# Patient Record
Sex: FEMALE | Race: BLACK OR AFRICAN AMERICAN | NOT HISPANIC OR LATINO
[De-identification: names, ages, dates, MRNs, and addresses within clinical notes are randomized per-mention and may not be internally consistent; named-entity substitution may affect disease eponyms.]

---

## 2019-05-25 ENCOUNTER — TRANSCRIPTION ENCOUNTER (OUTPATIENT)
Age: 44
End: 2019-05-25

## 2019-12-23 ENCOUNTER — NON-APPOINTMENT (OUTPATIENT)
Age: 44
End: 2019-12-23

## 2019-12-23 ENCOUNTER — APPOINTMENT (OUTPATIENT)
Dept: OPHTHALMOLOGY | Facility: CLINIC | Age: 44
End: 2019-12-23
Payer: COMMERCIAL

## 2019-12-23 PROCEDURE — 92004 COMPRE OPH EXAM NEW PT 1/>: CPT

## 2020-04-26 ENCOUNTER — MESSAGE (OUTPATIENT)
Age: 45
End: 2020-04-26

## 2020-05-12 LAB
SARS-COV-2 IGG SERPL IA-ACNC: 0.2 INDEX
SARS-COV-2 IGG SERPL QL IA: NEGATIVE

## 2020-10-14 ENCOUNTER — LABORATORY RESULT (OUTPATIENT)
Age: 45
End: 2020-10-14

## 2020-10-14 ENCOUNTER — TRANSCRIPTION ENCOUNTER (OUTPATIENT)
Age: 45
End: 2020-10-14

## 2020-10-14 ENCOUNTER — APPOINTMENT (OUTPATIENT)
Dept: INTERNAL MEDICINE | Facility: CLINIC | Age: 45
End: 2020-10-14
Payer: COMMERCIAL

## 2020-10-14 VITALS
HEIGHT: 66 IN | DIASTOLIC BLOOD PRESSURE: 71 MMHG | BODY MASS INDEX: 31.98 KG/M2 | WEIGHT: 199 LBS | OXYGEN SATURATION: 100 % | TEMPERATURE: 98.1 F | HEART RATE: 68 BPM | SYSTOLIC BLOOD PRESSURE: 115 MMHG

## 2020-10-14 DIAGNOSIS — Z80.3 FAMILY HISTORY OF MALIGNANT NEOPLASM OF BREAST: ICD-10-CM

## 2020-10-14 DIAGNOSIS — Z80.41 FAMILY HISTORY OF MALIGNANT NEOPLASM OF OVARY: ICD-10-CM

## 2020-10-14 DIAGNOSIS — Z78.9 OTHER SPECIFIED HEALTH STATUS: ICD-10-CM

## 2020-10-14 DIAGNOSIS — Z82.49 FAMILY HISTORY OF ISCHEMIC HEART DISEASE AND OTHER DISEASES OF THE CIRCULATORY SYSTEM: ICD-10-CM

## 2020-10-14 DIAGNOSIS — Z80.49 FAMILY HISTORY OF MALIGNANT NEOPLASM OF OTHER GENITAL ORGANS: ICD-10-CM

## 2020-10-14 PROCEDURE — 99386 PREV VISIT NEW AGE 40-64: CPT

## 2020-10-14 PROCEDURE — G0442 ANNUAL ALCOHOL SCREEN 15 MIN: CPT | Mod: NC

## 2020-10-14 NOTE — PHYSICAL EXAM
[Normal] : no respiratory distress, lungs were clear to auscultation bilaterally and no accessory muscle use [Soft] : abdomen soft [Non Tender] : non-tender [Non-distended] : non-distended [No HSM] : no HSM [de-identified] : small umbilical hernia w.o erythema, discoloration, tenderness

## 2020-10-14 NOTE — HEALTH RISK ASSESSMENT
[Patient reported mammogram was normal] : Patient reported mammogram was normal [Patient reported PAP Smear was normal] : Patient reported PAP Smear was normal [Yes] : Yes [Never (0 pts)] : Never (0 points) [1 or 2 (0 pts)] : 1 or 2 (0 points) [Monthly or less (1 pt)] : Monthly or less (1 point) [No] : In the past 12 months have you used drugs other than those required for medical reasons? No [0] : 2) Feeling down, depressed, or hopeless: Not at all (0) [Employed] : employed [Feels Safe at Home] : Feels safe at home [Fully functional (using the telephone, shopping, preparing meals, housekeeping, doing laundry, using] : Fully functional and needs no help or supervision to perform IADLs (using the telephone, shopping, preparing meals, housekeeping, doing laundry, using transportation, managing medications and managing finances) [Fully functional (bathing, dressing, toileting, transferring, walking, feeding)] : Fully functional (bathing, dressing, toileting, transferring, walking, feeding) [] : No [Audit-CScore] : 1 [[id number]] : 0 [Reports changes in hearing] : Reports no changes in hearing [Reports changes in vision] : Reports no changes in vision [Reports changes in dental health] : Reports no changes in dental health [PapSmearDate] : 07/19 [MammogramDate] : 07/19

## 2020-10-14 NOTE — HISTORY OF PRESENT ILLNESS
[de-identified] : \par Ms. 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 \par \par Diet: unpredicatble; skips breakfasts/\par \par Exercise regimen: more now than before; likes boot-camping or youtube 2-3x per week

## 2020-10-14 NOTE — PLAN
[FreeTextEntry1] : \par Ms. Tinajero is a 46 y/o female presents as a new patient for CPE\par No PMH\par \par 1. Umbilical hernia: moniotr for now; Pt counseled to call MD if new symptoms develop or worsen. \par \par 2. HCM: routine labs - CBC, CMP, TSH, A1C, Lipids, HIV, Hep B, Hep C screen today; up to date on vaccinations; Mammogram results to be sent by patient\par \par 3. Obesity: Urged calorie restriction and portion control. Limit carbohydrates and calorie dense foods; limit fats; also discussed importance of predictable meals - small frequent meals vs. traditional meals; 15 minutes spent on weight management counseling \par \par Pt counseled to call MD if new symptoms develop or worsen. \par all questions answered, patient amenable to plan above \par \par RTO in1 year or sooner

## 2020-10-20 ENCOUNTER — TRANSCRIPTION ENCOUNTER (OUTPATIENT)
Age: 45
End: 2020-10-20

## 2020-10-20 LAB
ALBUMIN SERPL ELPH-MCNC: 4.5 G/DL
ALP BLD-CCNC: 97 U/L
ALT SERPL-CCNC: 7 U/L
ANION GAP SERPL CALC-SCNC: 13 MMOL/L
AST SERPL-CCNC: 14 U/L
BASOPHILS # BLD AUTO: 0.1 K/UL
BASOPHILS NFR BLD AUTO: 1.5 %
BILIRUB SERPL-MCNC: 0.2 MG/DL
BUN SERPL-MCNC: 11 MG/DL
CALCIUM SERPL-MCNC: 9.9 MG/DL
CHLORIDE SERPL-SCNC: 105 MMOL/L
CHOLEST SERPL-MCNC: 168 MG/DL
CHOLEST/HDLC SERPL: 2.1 RATIO
CO2 SERPL-SCNC: 18 MMOL/L
CREAT SERPL-MCNC: 0.8 MG/DL
EOSINOPHIL # BLD AUTO: 0.1 K/UL
EOSINOPHIL NFR BLD AUTO: 1.5 %
ESTIMATED AVERAGE GLUCOSE: 105 MG/DL
GLUCOSE SERPL-MCNC: 93 MG/DL
HBA1C MFR BLD HPLC: 5.3 %
HBV SURFACE AB SER QL: REACTIVE
HBV SURFACE AG SER QL: NONREACTIVE
HCT VFR BLD CALC: 34.7 %
HCV AB SER QL: NONREACTIVE
HCV S/CO RATIO: 0.11 S/CO
HDLC SERPL-MCNC: 82 MG/DL
HGB BLD-MCNC: 9.1 G/DL
HIV1+2 AB SPEC QL IA.RAPID: NONREACTIVE
IMM GRANULOCYTES NFR BLD AUTO: 0.5 %
LDLC SERPL CALC-MCNC: 76 MG/DL
LYMPHOCYTES # BLD AUTO: 1.88 K/UL
LYMPHOCYTES NFR BLD AUTO: 29 %
MAN DIFF?: NORMAL
MCHC RBC-ENTMCNC: 18 PG
MCHC RBC-ENTMCNC: 26.2 GM/DL
MCV RBC AUTO: 68.7 FL
MONOCYTES # BLD AUTO: 0.68 K/UL
MONOCYTES NFR BLD AUTO: 10.5 %
NEUTROPHILS # BLD AUTO: 3.7 K/UL
NEUTROPHILS NFR BLD AUTO: 57 %
PLATELET # BLD AUTO: 360 K/UL
POTASSIUM SERPL-SCNC: 4.4 MMOL/L
PROT SERPL-MCNC: 7.6 G/DL
RBC # BLD: 5.05 M/UL
RBC # FLD: 23.2 %
SODIUM SERPL-SCNC: 137 MMOL/L
TRIGL SERPL-MCNC: 50 MG/DL
TSH SERPL-ACNC: 2.67 UIU/ML
WBC # FLD AUTO: 6.49 K/UL

## 2021-03-03 ENCOUNTER — TRANSCRIPTION ENCOUNTER (OUTPATIENT)
Age: 46
End: 2021-03-03

## 2021-04-09 ENCOUNTER — APPOINTMENT (OUTPATIENT)
Dept: BARIATRICS/WEIGHT MGMT | Facility: CLINIC | Age: 46
End: 2021-04-09
Payer: COMMERCIAL

## 2021-04-09 PROCEDURE — 99204 OFFICE O/P NEW MOD 45 MIN: CPT | Mod: 95

## 2021-04-26 NOTE — ASSESSMENT
[FreeTextEntry1] : 46 yo woman with obesity and ratcheting weight gain\par \par Recommend the following:\par reviewed metabolic labs\par whole foods based eating strategy limiting refined carbs and added fats - plant leaning\par try to consume 2-3 discrete meals instead of snacking/grazing\par keep food journal\par track daily activity\par more moderate intensity cardio and then back to HIIT, etc when physically fit\par will hold off on pharmacotherapy for now\par rtc in 4 weeks.\par

## 2021-04-26 NOTE — HISTORY OF PRESENT ILLNESS
[FreeTextEntry1] : 44 yo woman with obesity presents for initial eval\par \par She reports ratcheting weight gain throughout adulthood.\par Lost 20+ lbs with meds prior to joining  in 2002.  gained weight back after.  gained weight again with each of two pregnanies.  finds she gains more weight when not as physically active.\par \par she tends to consume a diet high in meat and particularly high in added fats.  she tends to avoid sugar but eats a fair amount of refined starches.  She tends to eat breakfast but then snacks throughout the day on stress food.  \par \par She enjoys boxing and HIIT but has not been active of late.  She owns an elliptical and treadmill and prefers to exercise in the AM.  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  She would like to lose substantial weight and

## 2021-05-21 ENCOUNTER — APPOINTMENT (OUTPATIENT)
Dept: BARIATRICS/WEIGHT MGMT | Facility: CLINIC | Age: 46
End: 2021-05-21
Payer: COMMERCIAL

## 2021-05-21 PROCEDURE — 99213 OFFICE O/P EST LOW 20 MIN: CPT | Mod: 95

## 2021-05-31 NOTE — ASSESSMENT
[FreeTextEntry1] : 46 yo woman with obesity and ratcheting weight gain - feels weight has stabilized and less bloated but no appreciable weight loss to date\par \par Recommend the following:\par had been getting more exercise - cont to advance\par focus on whole foods, plant leaning - minimize animal protein portions, legumes>fish>poultry\par will hold off on pharmacotherapy for now\par rtc in 4 weeks.\par

## 2021-05-31 NOTE — REASON FOR VISIT
[Initial Consultation] : an initial consultation for [Obesity] : obesity [Home] : at home, [unfilled] , at the time of the visit. [Other Location: e.g. Home (Enter Location, City,State)___] : at [unfilled] [Verbal consent obtained from patient] : the patient, [unfilled]

## 2021-05-31 NOTE — HISTORY OF PRESENT ILLNESS
[FreeTextEntry1] : 46 yo woman with obesity presents for initial eval\par \par She reports ratcheting weight gain throughout adulthood.\par Lost 20+ lbs with meds prior to joining  in 2002.  gained weight back after.  gained weight again with each of two pregnanies.  finds she gains more weight when not as physically active.\par \par she tends to consume a diet high in meat and particularly high in added fats.  she tends to avoid sugar but eats a fair amount of refined starches.  She tends to eat breakfast but then snacks throughout the day on stress food.  \par \par She enjoys boxing and HIIT but has not been active of late.  She owns an elliptical and treadmill and prefers to exercise in the AM.  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  She would like to lose substantial weight and

## 2021-08-25 ENCOUNTER — APPOINTMENT (OUTPATIENT)
Dept: ORTHOPEDIC SURGERY | Facility: CLINIC | Age: 46
End: 2021-08-25
Payer: COMMERCIAL

## 2021-08-25 VITALS
BODY MASS INDEX: 34.16 KG/M2 | WEIGHT: 205 LBS | OXYGEN SATURATION: 97 % | HEART RATE: 80 BPM | SYSTOLIC BLOOD PRESSURE: 121 MMHG | DIASTOLIC BLOOD PRESSURE: 69 MMHG | HEIGHT: 65 IN

## 2021-08-25 DIAGNOSIS — M79.18 MYALGIA, OTHER SITE: ICD-10-CM

## 2021-08-25 DIAGNOSIS — S16.1XXA STRAIN OF MUSCLE, FASCIA AND TENDON AT NECK LEVEL, INITIAL ENCOUNTER: ICD-10-CM

## 2021-08-25 PROCEDURE — 99203 OFFICE O/P NEW LOW 30 MIN: CPT

## 2021-08-25 PROCEDURE — 72050 X-RAY EXAM NECK SPINE 4/5VWS: CPT

## 2021-08-25 NOTE — PHYSICAL EXAM
[de-identified] : General: Well-nourished, well-developed, alert, and in no acute distress.\par Head: Normocephalic.\par Eyes: Pupils equal round reactive to light and accommodation, extraocular muscles intact, normal sclera.\par Nose: No nasal discharge.\par Cardiac: Regular rate. Extremities are warm and well perfused. Distal pulses are symmetric bilaterally.\par Respiratory: No labored breathing.\par Extremities: Sensation is intact distally bilaterally.  Distal pulses are symmetric bilaterally\par Lymphatic: No regional lymphadenopathy, no lymphedema\par Neurologic: No focal deficits\par Skin: Normal skin color, texture, and turgor\par Psychiatric: Normal affect\par MSK: as noted above/below\par \par \par \par Cervical Spine:\par \par Inspection:\par ·	Alignment/Posture: No scoliosis or deformity, FORWARD POSTUR\par ·	Spasm not noted. \par ·	No scars\par \par Palpation:   \par ·	Midline:  NO pain\par ·	Paracervical:    NO pain\par ·	Trapezius:   NO pain\par ·	Levator Scapulae:  NO pain\par ·	Rhomboids:  NO pain\par \par \par ROM: \par ·	 Flexion:  50 degrees, without pain.\par ·	Extension:  60 degrees without pain  \par ·	Side Bendin degrees without pain \par ·	Rotation:  80 degrees without pain \par \par \par Strength: \par ·	5/5 Flexion, Extension, Sidebending, Rotation\par \par NEURO  \par ·	Sensation:   Intact throughout the upper and lower extremity\par ·	DTR's:  Symmetric throughout the upper and lower extremity.  \par \par Other tests: \par ·	Spurling's Maneuver: NEGATIVE \par                 Ayala Test: Negative\par Vascular:  \par ·	No cyanosis, clubbing, edema.  \par ·	Intact pulses distally\par  [de-identified] : Xray Cervical Spine - Multiple views were reviewed with the patient in detail.  There is normal curvature.  There is no acute fracture.  There does not appear to be instability with flexion/extension.  Vertebral heights and disc spaces appear preserved.\par \par

## 2021-08-25 NOTE — DISCUSSION/SUMMARY
[Medication Risks Reviewed] : Medication risks reviewed [de-identified] : The patient is a 45 year old, rhd woman presenting with a 4 month history of chronic, atraumatic lower paracervical soreness.  No acute neurologic deficits today.  Her symptoms are likely secondary to cervical strain/text neck.\par \par Imaging/Diagnostics/Medical Records were interpreted and/or reviewed and results were reviewed with the patient in detail.  All questions were answered appropriately.\par \par I discussed the treatment of neck pain/radiculopathy with the patient at length today. I described the spectrum of treatment from nonoperative modalities to surgery. Noninvasive and nonoperative treatment modalities include relative rest, activity modification, PRN use of acetaminophen/ anti-inflammatory medication if tolerated, corticosteroids if indicated, home exercise program and physical therapy.  We also discussed adjunctive treatment including heat therapy, accupuncture, manual therapy, TENS unit.  Further treatments can include interventional spine procedures such as epidural injections, and surgical consultation.\par \par The patient was counseled on ergonomic modifications - lumbar/cervical support, frequent breaks, screens/phones at eye level.  The patient was also provided some general home exercises to focus on posture correction, scapular stabilization.  The patient was counseled on activity modification.\par \par Warm compresses, local lidoderm cream as needed.\par \par Follow-up in 4-6 weeks if symptoms continue.  Consider MRI imaging.\par \par ------------------------------------------------------------------------------------------------------------------\par Patient appreciates and agrees with current plan.\par \par The patient's diagnosis above was evaluated by me, personally.  Diagnostic Testing and treatment options were discussed with the patient in detail.  The risks/benefits/potential complications of diagnostic testing/treatments were described in detail.  \par \par This note was generated using a mixture of manual typing and dragon medical dictation software.  A reasonable effort has been made for proofreading its contents, but typos may still remain.  If there are any questions or points of clarification needed please notify my office.\par \par \par >30 minutes of time was spent on total encounter.  >50% of the visit was spent on face-to-face counseling/coordination of care and medical-decision making for this patient.\par \par \par

## 2021-08-25 NOTE — HISTORY OF PRESENT ILLNESS
[de-identified] : The patient is a 45 year old, rhd woman presenting with neck pain.\par \par She works at the [employer] [facility].\par \par She presents with a 4 month history of chronic, atraumatic lower paracervical soreness.  The patient denies distal numbness, weakness, paresthesias.  The patient denies red flag symptoms including fever, chills, weight loss, night sweats, bowel/bladder dysfunction, saddle anesthesia.  She is not particularly active.  She does sit for prolonged periods of time, and thinks that prolonged neck flexion puts strain on her neck area.  She denies dropping objects or gait instability.\par \par Pain is mild in intensity, described as achy, improved with rest, worse with prolonged neck flexion.

## 2021-09-02 ENCOUNTER — APPOINTMENT (OUTPATIENT)
Dept: ORTHOPEDIC SURGERY | Facility: CLINIC | Age: 46
End: 2021-09-02

## 2021-11-03 ENCOUNTER — APPOINTMENT (OUTPATIENT)
Dept: PLASTIC SURGERY | Facility: CLINIC | Age: 46
End: 2021-11-03

## 2021-12-13 ENCOUNTER — APPOINTMENT (OUTPATIENT)
Dept: HUMAN REPRODUCTION | Facility: CLINIC | Age: 46
End: 2021-12-13

## 2022-05-04 ENCOUNTER — OUTPATIENT (OUTPATIENT)
Dept: OUTPATIENT SERVICES | Facility: HOSPITAL | Age: 47
LOS: 1 days | End: 2022-05-04

## 2022-05-04 ENCOUNTER — APPOINTMENT (OUTPATIENT)
Dept: INFECTIOUS DISEASE | Facility: CLINIC | Age: 47
End: 2022-05-04
Payer: COMMERCIAL

## 2022-05-04 VITALS
SYSTOLIC BLOOD PRESSURE: 104 MMHG | BODY MASS INDEX: 35.82 KG/M2 | RESPIRATION RATE: 12 BRPM | TEMPERATURE: 97.5 F | DIASTOLIC BLOOD PRESSURE: 71 MMHG | OXYGEN SATURATION: 99 % | HEIGHT: 65 IN | WEIGHT: 215 LBS | HEART RATE: 68 BPM

## 2022-05-04 DIAGNOSIS — R51.9 HEADACHE, UNSPECIFIED: ICD-10-CM

## 2022-05-04 DIAGNOSIS — Z12.31 ENCOUNTER FOR SCREENING MAMMOGRAM FOR MALIGNANT NEOPLASM OF BREAST: ICD-10-CM

## 2022-05-04 DIAGNOSIS — Z12.4 ENCOUNTER FOR SCREENING FOR MALIGNANT NEOPLASM OF CERVIX: ICD-10-CM

## 2022-05-04 DIAGNOSIS — R53.83 OTHER FATIGUE: ICD-10-CM

## 2022-05-04 DIAGNOSIS — Z00.00 ENCOUNTER FOR GENERAL ADULT MEDICAL EXAMINATION W/OUT ABNORMAL FINDINGS: ICD-10-CM

## 2022-05-04 DIAGNOSIS — E66.9 OBESITY, UNSPECIFIED: ICD-10-CM

## 2022-05-04 DIAGNOSIS — Z11.3 ENCOUNTER FOR SCREENING FOR INFECTIONS WITH A PREDOMINANTLY SEXUAL MODE OF TRANSMISSION: ICD-10-CM

## 2022-05-04 DIAGNOSIS — D25.9 LEIOMYOMA OF UTERUS, UNSPECIFIED: ICD-10-CM

## 2022-05-04 LAB
A1C WITH ESTIMATED AVERAGE GLUCOSE RESULT: 5 % — SIGNIFICANT CHANGE UP (ref 4–5.6)
ALBUMIN SERPL ELPH-MCNC: 4.3 G/DL — SIGNIFICANT CHANGE UP (ref 3.3–5)
ALP SERPL-CCNC: 103 U/L — SIGNIFICANT CHANGE UP (ref 40–120)
ALT FLD-CCNC: 10 U/L — SIGNIFICANT CHANGE UP (ref 10–45)
ANION GAP SERPL CALC-SCNC: 13 MMOL/L — SIGNIFICANT CHANGE UP (ref 5–17)
APPEARANCE UR: CLEAR — SIGNIFICANT CHANGE UP
AST SERPL-CCNC: 18 U/L — SIGNIFICANT CHANGE UP (ref 10–40)
BACTERIA # UR AUTO: PRESENT /HPF
BILIRUB SERPL-MCNC: 0.2 MG/DL — SIGNIFICANT CHANGE UP (ref 0.2–1.2)
BILIRUB UR-MCNC: NEGATIVE — SIGNIFICANT CHANGE UP
BUN SERPL-MCNC: 11 MG/DL — SIGNIFICANT CHANGE UP (ref 7–23)
CALCIUM SERPL-MCNC: 9.6 MG/DL — SIGNIFICANT CHANGE UP (ref 8.4–10.5)
CHLORIDE SERPL-SCNC: 105 MMOL/L — SIGNIFICANT CHANGE UP (ref 96–108)
CHOLEST SERPL-MCNC: 174 MG/DL — SIGNIFICANT CHANGE UP
CO2 SERPL-SCNC: 21 MMOL/L — LOW (ref 22–31)
COLOR SPEC: YELLOW — SIGNIFICANT CHANGE UP
COMMENT - URINE: SIGNIFICANT CHANGE UP
CREAT SERPL-MCNC: 0.68 MG/DL — SIGNIFICANT CHANGE UP (ref 0.5–1.3)
DIFF PNL FLD: ABNORMAL
EGFR: 110 ML/MIN/1.73M2 — SIGNIFICANT CHANGE UP
EPI CELLS # UR: ABNORMAL /HPF (ref 0–5)
ESTIMATED AVERAGE GLUCOSE: 97 MG/DL — SIGNIFICANT CHANGE UP (ref 68–114)
FERRITIN SERPL-MCNC: 6 NG/ML — LOW (ref 15–150)
GLUCOSE SERPL-MCNC: 88 MG/DL — SIGNIFICANT CHANGE UP (ref 70–99)
GLUCOSE UR QL: NEGATIVE — SIGNIFICANT CHANGE UP
HCT VFR BLD CALC: 30.8 % — LOW (ref 34.5–45)
HDLC SERPL-MCNC: 79 MG/DL — SIGNIFICANT CHANGE UP
HGB BLD-MCNC: 8.9 G/DL — LOW (ref 11.5–15.5)
HIV 1+2 AB+HIV1 P24 AG SERPL QL IA: SIGNIFICANT CHANGE UP
IRON SATN MFR SERPL: 15 UG/DL — LOW (ref 30–160)
IRON SATN MFR SERPL: 4 % — LOW (ref 14–50)
KETONES UR-MCNC: NEGATIVE — SIGNIFICANT CHANGE UP
LEUKOCYTE ESTERASE UR-ACNC: ABNORMAL
LIPID PNL WITH DIRECT LDL SERPL: 78 MG/DL — SIGNIFICANT CHANGE UP
MCHC RBC-ENTMCNC: 18.7 PG — LOW (ref 27–34)
MCHC RBC-ENTMCNC: 28.9 GM/DL — LOW (ref 32–36)
MCV RBC AUTO: 64.7 FL — LOW (ref 80–100)
NITRITE UR-MCNC: NEGATIVE — SIGNIFICANT CHANGE UP
NON HDL CHOLESTEROL: 95 MG/DL — SIGNIFICANT CHANGE UP
NRBC # BLD: 0 /100 WBCS — SIGNIFICANT CHANGE UP (ref 0–0)
PH UR: 6.5 — SIGNIFICANT CHANGE UP (ref 5–8)
PLATELET # BLD AUTO: 373 K/UL — SIGNIFICANT CHANGE UP (ref 150–400)
POTASSIUM SERPL-MCNC: 4.1 MMOL/L — SIGNIFICANT CHANGE UP (ref 3.5–5.3)
POTASSIUM SERPL-SCNC: 4.1 MMOL/L — SIGNIFICANT CHANGE UP (ref 3.5–5.3)
PROT SERPL-MCNC: 7.7 G/DL — SIGNIFICANT CHANGE UP (ref 6–8.3)
PROT UR-MCNC: NEGATIVE MG/DL — SIGNIFICANT CHANGE UP
RBC # BLD: 4.76 M/UL — SIGNIFICANT CHANGE UP (ref 3.8–5.2)
RBC # FLD: 21.8 % — HIGH (ref 10.3–14.5)
RBC CASTS # UR COMP ASSIST: > 10 /HPF
SODIUM SERPL-SCNC: 139 MMOL/L — SIGNIFICANT CHANGE UP (ref 135–145)
SP GR SPEC: 1.02 — SIGNIFICANT CHANGE UP (ref 1–1.03)
TIBC SERPL-MCNC: 396 UG/DL — SIGNIFICANT CHANGE UP (ref 220–430)
TRANSFERRIN SERPL-MCNC: 315 MG/DL — SIGNIFICANT CHANGE UP (ref 200–360)
TRIGL SERPL-MCNC: 87 MG/DL — SIGNIFICANT CHANGE UP
TSH SERPL-MCNC: 1.86 UIU/ML — SIGNIFICANT CHANGE UP (ref 0.27–4.2)
UIBC SERPL-MCNC: 381 UG/DL — HIGH (ref 110–370)
UROBILINOGEN FLD QL: 0.2 E.U./DL — SIGNIFICANT CHANGE UP
WBC # BLD: 9 K/UL — SIGNIFICANT CHANGE UP (ref 3.8–10.5)
WBC # FLD AUTO: 9 K/UL — SIGNIFICANT CHANGE UP (ref 3.8–10.5)
WBC UR QL: < 5 /HPF — SIGNIFICANT CHANGE UP

## 2022-05-04 PROCEDURE — 99213 OFFICE O/P EST LOW 20 MIN: CPT | Mod: 25

## 2022-05-04 PROCEDURE — 99386 PREV VISIT NEW AGE 40-64: CPT

## 2022-05-05 DIAGNOSIS — Z00.00 ENCOUNTER FOR GENERAL ADULT MEDICAL EXAMINATION WITHOUT ABNORMAL FINDINGS: ICD-10-CM

## 2022-05-05 DIAGNOSIS — Z12.4 ENCOUNTER FOR SCREENING FOR MALIGNANT NEOPLASM OF CERVIX: ICD-10-CM

## 2022-05-05 DIAGNOSIS — D25.9 LEIOMYOMA OF UTERUS, UNSPECIFIED: ICD-10-CM

## 2022-05-05 DIAGNOSIS — R51.9 HEADACHE, UNSPECIFIED: ICD-10-CM

## 2022-05-05 DIAGNOSIS — D50.9 IRON DEFICIENCY ANEMIA, UNSPECIFIED: ICD-10-CM

## 2022-05-05 DIAGNOSIS — Z11.3 ENCOUNTER FOR SCREENING FOR INFECTIONS WITH A PREDOMINANTLY SEXUAL MODE OF TRANSMISSION: ICD-10-CM

## 2022-05-05 DIAGNOSIS — R53.83 OTHER FATIGUE: ICD-10-CM

## 2022-05-05 DIAGNOSIS — Z12.31 ENCOUNTER FOR SCREENING MAMMOGRAM FOR MALIGNANT NEOPLASM OF BREAST: ICD-10-CM

## 2022-05-05 DIAGNOSIS — E66.9 OBESITY, UNSPECIFIED: ICD-10-CM

## 2022-05-05 LAB
24R-OH-CALCIDIOL SERPL-MCNC: 24.2 NG/ML — LOW (ref 30–80)
C TRACH RRNA SPEC QL NAA+PROBE: SIGNIFICANT CHANGE UP
N GONORRHOEA RRNA SPEC QL NAA+PROBE: SIGNIFICANT CHANGE UP
SPECIMEN SOURCE: SIGNIFICANT CHANGE UP
T PALLIDUM AB TITR SER: NEGATIVE — SIGNIFICANT CHANGE UP

## 2022-05-05 NOTE — HISTORY OF PRESENT ILLNESS
[FreeTextEntry1] : Headaches, fatigue eval\par Establish care [de-identified] : 46F w/ Fe def anemia presents to establish care.\par \par Reports worsening fatigue. Is concerned about her iron levels.\par No weight changes or heat/cold intolerance.  \par \par Also noted to have weekly headaches, not debilitating, relieved by tylenol.  Frontal, occasional in AM but mostly during the day.  Minimal caffeine intake.  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  \par Needs pap and mammo.

## 2022-05-05 NOTE — PLAN
[FreeTextEntry1] : 46F presents to establish care\par \par Fatigue: likely 2/2 anemia -- will check thyroid and CBC/CMP.\par Fe def anemia: CBC and Fe studies today.  On oral supp.  May need heme for Fe infusions.\par Headaches: Likely tensions headache given location and timing.  PRN tylenol, symptom log.  Could consider topiramate if worsens.\par Mammo ordered\par Gyn referral for pap and eval of uterine fibroids.\par BMI >30 : lipids and A1c today\par STD screening.\par \par RTC 3 months for montioring of fatigue symptoms and headaches.

## 2022-05-10 ENCOUNTER — TRANSCRIPTION ENCOUNTER (OUTPATIENT)
Age: 47
End: 2022-05-10

## 2022-05-10 RX ORDER — UBIDECARENONE/VIT E ACET 100MG-5
50 MCG CAPSULE ORAL
Qty: 30 | Refills: 2 | Status: ACTIVE | COMMUNITY
Start: 2022-05-10 | End: 1900-01-01

## 2022-05-12 ENCOUNTER — APPOINTMENT (OUTPATIENT)
Dept: MAMMOGRAPHY | Facility: HOSPITAL | Age: 47
End: 2022-05-12

## 2022-05-13 ENCOUNTER — APPOINTMENT (OUTPATIENT)
Dept: OBGYN | Facility: CLINIC | Age: 47
End: 2022-05-13

## 2022-05-16 ENCOUNTER — RESULT REVIEW (OUTPATIENT)
Age: 47
End: 2022-05-16

## 2022-06-10 ENCOUNTER — OUTPATIENT (OUTPATIENT)
Dept: OUTPATIENT SERVICES | Facility: HOSPITAL | Age: 47
LOS: 1 days | End: 2022-06-10
Payer: COMMERCIAL

## 2022-06-10 ENCOUNTER — APPOINTMENT (OUTPATIENT)
Dept: ULTRASOUND IMAGING | Facility: HOSPITAL | Age: 47
End: 2022-06-10

## 2022-06-10 PROCEDURE — 76856 US EXAM PELVIC COMPLETE: CPT

## 2022-06-10 PROCEDURE — 76830 TRANSVAGINAL US NON-OB: CPT

## 2022-06-10 PROCEDURE — 76856 US EXAM PELVIC COMPLETE: CPT | Mod: 26

## 2022-06-10 PROCEDURE — 76830 TRANSVAGINAL US NON-OB: CPT | Mod: 26

## 2022-06-26 ENCOUNTER — NON-APPOINTMENT (OUTPATIENT)
Age: 47
End: 2022-06-26

## 2022-08-03 ENCOUNTER — APPOINTMENT (OUTPATIENT)
Dept: INFECTIOUS DISEASE | Facility: CLINIC | Age: 47
End: 2022-08-03

## 2022-09-14 ENCOUNTER — LABORATORY RESULT (OUTPATIENT)
Age: 47
End: 2022-09-14

## 2022-09-14 ENCOUNTER — APPOINTMENT (OUTPATIENT)
Dept: HEMATOLOGY ONCOLOGY | Facility: CLINIC | Age: 47
End: 2022-09-14

## 2022-09-14 VITALS
TEMPERATURE: 97.3 F | SYSTOLIC BLOOD PRESSURE: 125 MMHG | HEIGHT: 65 IN | BODY MASS INDEX: 36.65 KG/M2 | OXYGEN SATURATION: 100 % | WEIGHT: 220 LBS | HEART RATE: 84 BPM | DIASTOLIC BLOOD PRESSURE: 76 MMHG

## 2022-09-14 DIAGNOSIS — D50.9 IRON DEFICIENCY ANEMIA, UNSPECIFIED: ICD-10-CM

## 2022-09-14 DIAGNOSIS — R79.89 OTHER SPECIFIED ABNORMAL FINDINGS OF BLOOD CHEMISTRY: ICD-10-CM

## 2022-09-14 PROCEDURE — 36415 COLL VENOUS BLD VENIPUNCTURE: CPT

## 2022-09-14 PROCEDURE — 99203 OFFICE O/P NEW LOW 30 MIN: CPT | Mod: 25

## 2022-09-15 ENCOUNTER — TRANSCRIPTION ENCOUNTER (OUTPATIENT)
Age: 47
End: 2022-09-15

## 2022-09-15 LAB
25(OH)D3 SERPL-MCNC: 22.2 NG/ML
APTT BLD: 35.2 SEC
ERYTHROCYTE [SEDIMENTATION RATE] IN BLOOD BY WESTERGREN METHOD: 69 MM/HR
FACT VIII ACT/NOR PPP: 133 %
FERRITIN SERPL-MCNC: 5 NG/ML
HAPTOGLOB SERPL-MCNC: 127 MG/DL
IGA SER QL IEP: 172 MG/DL
IRON SATN MFR SERPL: 3 %
IRON SERPL-MCNC: 13 UG/DL
LDH SERPL-CCNC: 184 U/L
TIBC SERPL-MCNC: 430 UG/DL
UIBC SERPL-MCNC: 418 UG/DL
VIT B12 SERPL-MCNC: 1184 PG/ML
VWF AG PPP IA-ACNC: 113 %
VWF:RCO ACT/NOR PPP PL AGG: 87 %

## 2022-09-16 LAB
BASOPHILS # BLD AUTO: 0.12 K/UL
BASOPHILS NFR BLD AUTO: 1.3 %
EOSINOPHIL # BLD AUTO: 0.23 K/UL
EOSINOPHIL NFR BLD AUTO: 2.4 %
HCT VFR BLD CALC: 30.7 %
HGB BLD-MCNC: 8.4 G/DL
IMM GRANULOCYTES NFR BLD AUTO: 0.2 %
LYMPHOCYTES # BLD AUTO: 2.19 K/UL
LYMPHOCYTES NFR BLD AUTO: 23.1 %
MAN DIFF?: NORMAL
MCHC RBC-ENTMCNC: 18.2 PG
MCHC RBC-ENTMCNC: 27.4 GM/DL
MCV RBC AUTO: 66.6 FL
MONOCYTES # BLD AUTO: 0.97 K/UL
MONOCYTES NFR BLD AUTO: 10.2 %
NEUTROPHILS # BLD AUTO: 5.97 K/UL
NEUTROPHILS NFR BLD AUTO: 62.8 %
PLATELET # BLD AUTO: 457 K/UL
RBC # BLD: 4.61 M/UL
RBC # FLD: 22.1 %
WBC # FLD AUTO: 9.5 K/UL

## 2022-09-19 ENCOUNTER — TRANSCRIPTION ENCOUNTER (OUTPATIENT)
Age: 47
End: 2022-09-19

## 2022-09-21 NOTE — HISTORY OF PRESENT ILLNESS
[de-identified] : 47 years old -American female was found to have severe iron deficiency anemia back in May 2022... She has been taking oral iron since then...\par \par She has a history of excessive bleeding after her second  and after her myomectomy requiring transfusion of 1 unit of packed RBCs...\par \par Admits to heavy menses all her life....

## 2022-09-21 NOTE — CONSULT LETTER
[Dear  ___] : Dear  [unfilled], [Consult Letter:] : I had the pleasure of evaluating your patient, [unfilled]. [Please see my note below.] : Please see my note below. [Consult Closing:] : Thank you very much for allowing me to participate in the care of this patient.  If you have any questions, please do not hesitate to contact me. [Sincerely,] : Sincerely, [FreeTextEntry3] : Angela Campo MD\par

## 2022-09-21 NOTE — ASSESSMENT
[FreeTextEntry1] : Repeat blood work done, patient's hemoglobin is down to 8.4 g/dL, and I placed orders for intravenous Venofer to be given once we obtain authorization from patient's insurance\par \par \par Since patient has history of transfusions, I did blood work for von Willebrand as well as celiac disease.\par \par Follow-up here 2 weeks after the last dose of intravenous iron.\par \par Discussed with patient her vitamin D deficiency and she was given written information about vitamin D replacement.\par

## 2022-09-27 ENCOUNTER — TRANSCRIPTION ENCOUNTER (OUTPATIENT)
Age: 47
End: 2022-09-27

## 2022-09-27 LAB
TTG IGA SER IA-ACNC: <1.2 U/ML
TTG IGA SER-ACNC: NEGATIVE
TTG IGG SER IA-ACNC: 16.7 U/ML
TTG IGG SER IA-ACNC: POSITIVE
VWF MULTIMERS PPP IA-ACNC: NORMAL

## 2022-09-29 RX ORDER — IRON SUCROSE 20 MG/ML
200 INJECTION, SOLUTION INTRAVENOUS ONCE
Refills: 0 | Status: COMPLETED | OUTPATIENT
Start: 2022-09-30 | End: 2022-09-30

## 2022-09-30 ENCOUNTER — APPOINTMENT (OUTPATIENT)
Dept: INFUSION THERAPY | Facility: CLINIC | Age: 47
End: 2022-09-30

## 2022-09-30 ENCOUNTER — OUTPATIENT (OUTPATIENT)
Dept: OUTPATIENT SERVICES | Facility: HOSPITAL | Age: 47
LOS: 1 days | End: 2022-09-30
Payer: COMMERCIAL

## 2022-09-30 VITALS
OXYGEN SATURATION: 99 % | TEMPERATURE: 99 F | RESPIRATION RATE: 18 BRPM | DIASTOLIC BLOOD PRESSURE: 71 MMHG | HEART RATE: 82 BPM | SYSTOLIC BLOOD PRESSURE: 114 MMHG

## 2022-09-30 VITALS
OXYGEN SATURATION: 100 % | DIASTOLIC BLOOD PRESSURE: 71 MMHG | HEART RATE: 65 BPM | RESPIRATION RATE: 18 BRPM | SYSTOLIC BLOOD PRESSURE: 129 MMHG | TEMPERATURE: 99 F

## 2022-09-30 DIAGNOSIS — D50.9 IRON DEFICIENCY ANEMIA, UNSPECIFIED: ICD-10-CM

## 2022-09-30 PROCEDURE — 96365 THER/PROPH/DIAG IV INF INIT: CPT

## 2022-09-30 RX ADMIN — IRON SUCROSE 200 MILLIGRAM(S): 20 INJECTION, SOLUTION INTRAVENOUS at 15:08

## 2022-09-30 RX ADMIN — IRON SUCROSE 110 MILLIGRAM(S): 20 INJECTION, SOLUTION INTRAVENOUS at 14:08

## 2022-10-07 ENCOUNTER — APPOINTMENT (OUTPATIENT)
Dept: INFUSION THERAPY | Facility: CLINIC | Age: 47
End: 2022-10-07

## 2022-10-07 ENCOUNTER — OUTPATIENT (OUTPATIENT)
Dept: OUTPATIENT SERVICES | Facility: HOSPITAL | Age: 47
LOS: 1 days | End: 2022-10-07
Payer: COMMERCIAL

## 2022-10-07 VITALS
TEMPERATURE: 98 F | OXYGEN SATURATION: 99 % | HEART RATE: 70 BPM | SYSTOLIC BLOOD PRESSURE: 120 MMHG | DIASTOLIC BLOOD PRESSURE: 74 MMHG | RESPIRATION RATE: 18 BRPM

## 2022-10-07 DIAGNOSIS — D50.9 IRON DEFICIENCY ANEMIA, UNSPECIFIED: ICD-10-CM

## 2022-10-07 PROCEDURE — 96365 THER/PROPH/DIAG IV INF INIT: CPT

## 2022-10-07 RX ORDER — IRON SUCROSE 20 MG/ML
200 INJECTION, SOLUTION INTRAVENOUS ONCE
Refills: 0 | Status: COMPLETED | OUTPATIENT
Start: 2022-10-07 | End: 2022-10-07

## 2022-10-07 RX ADMIN — IRON SUCROSE 110 MILLIGRAM(S): 20 INJECTION, SOLUTION INTRAVENOUS at 15:05

## 2022-10-07 RX ADMIN — IRON SUCROSE 200 MILLIGRAM(S): 20 INJECTION, SOLUTION INTRAVENOUS at 16:05

## 2022-10-14 ENCOUNTER — APPOINTMENT (OUTPATIENT)
Dept: INFUSION THERAPY | Facility: CLINIC | Age: 47
End: 2022-10-14

## 2022-10-21 ENCOUNTER — OUTPATIENT (OUTPATIENT)
Dept: OUTPATIENT SERVICES | Facility: HOSPITAL | Age: 47
LOS: 1 days | End: 2022-10-21
Payer: COMMERCIAL

## 2022-10-21 ENCOUNTER — APPOINTMENT (OUTPATIENT)
Dept: INFUSION THERAPY | Facility: CLINIC | Age: 47
End: 2022-10-21

## 2022-10-21 VITALS
RESPIRATION RATE: 18 BRPM | HEART RATE: 72 BPM | SYSTOLIC BLOOD PRESSURE: 120 MMHG | DIASTOLIC BLOOD PRESSURE: 76 MMHG | OXYGEN SATURATION: 100 % | TEMPERATURE: 97 F

## 2022-10-21 DIAGNOSIS — D50.9 IRON DEFICIENCY ANEMIA, UNSPECIFIED: ICD-10-CM

## 2022-10-21 PROCEDURE — 96365 THER/PROPH/DIAG IV INF INIT: CPT

## 2022-10-21 RX ORDER — IRON SUCROSE 20 MG/ML
200 INJECTION, SOLUTION INTRAVENOUS ONCE
Refills: 0 | Status: COMPLETED | OUTPATIENT
Start: 2022-10-21 | End: 2022-10-21

## 2022-10-21 RX ADMIN — IRON SUCROSE 200 MILLIGRAM(S): 20 INJECTION, SOLUTION INTRAVENOUS at 16:10

## 2022-10-21 RX ADMIN — IRON SUCROSE 110 MILLIGRAM(S): 20 INJECTION, SOLUTION INTRAVENOUS at 15:10

## 2022-10-28 ENCOUNTER — OUTPATIENT (OUTPATIENT)
Dept: OUTPATIENT SERVICES | Facility: HOSPITAL | Age: 47
LOS: 1 days | End: 2022-10-28
Payer: COMMERCIAL

## 2022-10-28 ENCOUNTER — APPOINTMENT (OUTPATIENT)
Dept: INFUSION THERAPY | Facility: CLINIC | Age: 47
End: 2022-10-28

## 2022-10-28 VITALS
DIASTOLIC BLOOD PRESSURE: 76 MMHG | SYSTOLIC BLOOD PRESSURE: 118 MMHG | OXYGEN SATURATION: 99 % | HEART RATE: 67 BPM | RESPIRATION RATE: 16 BRPM | TEMPERATURE: 99 F

## 2022-10-28 DIAGNOSIS — D50.9 IRON DEFICIENCY ANEMIA, UNSPECIFIED: ICD-10-CM

## 2022-10-28 PROCEDURE — 96365 THER/PROPH/DIAG IV INF INIT: CPT

## 2022-10-28 RX ORDER — IRON SUCROSE 20 MG/ML
200 INJECTION, SOLUTION INTRAVENOUS ONCE
Refills: 0 | Status: COMPLETED | OUTPATIENT
Start: 2022-10-28 | End: 2022-10-28

## 2022-10-28 RX ADMIN — IRON SUCROSE 110 MILLIGRAM(S): 20 INJECTION, SOLUTION INTRAVENOUS at 14:39

## 2022-10-28 RX ADMIN — IRON SUCROSE 200 MILLIGRAM(S): 20 INJECTION, SOLUTION INTRAVENOUS at 15:39

## 2022-11-04 ENCOUNTER — APPOINTMENT (OUTPATIENT)
Dept: INFUSION THERAPY | Facility: CLINIC | Age: 47
End: 2022-11-04

## 2022-11-04 ENCOUNTER — OUTPATIENT (OUTPATIENT)
Dept: OUTPATIENT SERVICES | Facility: HOSPITAL | Age: 47
LOS: 1 days | End: 2022-11-04
Payer: COMMERCIAL

## 2022-11-04 VITALS
OXYGEN SATURATION: 100 % | RESPIRATION RATE: 16 BRPM | TEMPERATURE: 98 F | DIASTOLIC BLOOD PRESSURE: 68 MMHG | SYSTOLIC BLOOD PRESSURE: 109 MMHG | HEART RATE: 62 BPM

## 2022-11-04 DIAGNOSIS — D50.9 IRON DEFICIENCY ANEMIA, UNSPECIFIED: ICD-10-CM

## 2022-11-04 PROCEDURE — 96365 THER/PROPH/DIAG IV INF INIT: CPT

## 2022-11-04 RX ORDER — IRON SUCROSE 20 MG/ML
200 INJECTION, SOLUTION INTRAVENOUS ONCE
Refills: 0 | Status: COMPLETED | OUTPATIENT
Start: 2022-11-04 | End: 2022-11-04

## 2022-11-04 RX ADMIN — IRON SUCROSE 200 MILLIGRAM(S): 20 INJECTION, SOLUTION INTRAVENOUS at 15:54

## 2022-11-04 RX ADMIN — IRON SUCROSE 110 MILLIGRAM(S): 20 INJECTION, SOLUTION INTRAVENOUS at 14:54

## 2022-11-07 ENCOUNTER — APPOINTMENT (OUTPATIENT)
Age: 47
End: 2022-11-07

## 2023-03-08 ENCOUNTER — APPOINTMENT (OUTPATIENT)
Dept: GASTROENTEROLOGY | Facility: CLINIC | Age: 48
End: 2023-03-08
Payer: COMMERCIAL

## 2023-03-08 PROCEDURE — 99202 OFFICE O/P NEW SF 15 MIN: CPT | Mod: 95

## 2023-03-08 NOTE — HISTORY OF PRESENT ILLNESS
[FreeTextEntry1] : 47F presents for BEN eval\par no nausea vomiting abd pain no blood in stool\par has had anemia and heavy periods due to fibroids, fibroids removed but then returned\par constipation since she was young now worse \par DOES HAVE SPECIFIC BLOATING RESPONSE TO EATING GLUTEN\par fhx- diverticulosis, no colon polyps, colon \par s/p iron infusions last december now PO

## 2023-03-08 NOTE — ASSESSMENT
[FreeTextEntry1] : BEN likely mostly due to fibroids but will evaluate for celiac with +serology and specific reaction reported to gluten and colonoscopy as due at age 47\par - egd, cscope r/b/a/i discussed and patient agreeable\par - counseled not to stop eating gluten before procedure\par - cbc, comp\par - cont iron, f/u with heme\par - f/u after above

## 2023-03-08 NOTE — REASON FOR VISIT
[Home] : at home, [unfilled] , at the time of the visit. [Medical Office: ([facility])___] : at the medical office located in  [Patient] : the patient [Self] : self [Initial Evaluation] : an initial evaluation

## 2023-03-21 ENCOUNTER — APPOINTMENT (OUTPATIENT)
Age: 48
End: 2023-03-21
Payer: COMMERCIAL

## 2023-03-21 ENCOUNTER — OUTPATIENT (OUTPATIENT)
Dept: OUTPATIENT SERVICES | Facility: HOSPITAL | Age: 48
LOS: 1 days | Discharge: HOME | End: 2023-03-21

## 2023-03-21 PROCEDURE — 99203 OFFICE O/P NEW LOW 30 MIN: CPT | Mod: 95

## 2023-04-14 DIAGNOSIS — F32.1 MAJOR DEPRESSIVE DISORDER, SINGLE EPISODE, MODERATE: ICD-10-CM

## 2023-05-16 ENCOUNTER — NON-APPOINTMENT (OUTPATIENT)
Age: 48
End: 2023-05-16

## 2023-05-17 ENCOUNTER — APPOINTMENT (OUTPATIENT)
Dept: SURGERY | Facility: CLINIC | Age: 48
End: 2023-05-17
Payer: COMMERCIAL

## 2023-05-17 VITALS
HEART RATE: 83 BPM | SYSTOLIC BLOOD PRESSURE: 132 MMHG | DIASTOLIC BLOOD PRESSURE: 80 MMHG | WEIGHT: 229 LBS | RESPIRATION RATE: 16 BRPM | BODY MASS INDEX: 38.15 KG/M2 | OXYGEN SATURATION: 98 % | HEIGHT: 65 IN

## 2023-05-17 DIAGNOSIS — K42.9 UMBILICAL HERNIA W/OUT OBSTRUCTION OR GANGRENE: ICD-10-CM

## 2023-05-17 DIAGNOSIS — E66.01 MORBID (SEVERE) OBESITY DUE TO EXCESS CALORIES: ICD-10-CM

## 2023-05-17 PROCEDURE — 99204 OFFICE O/P NEW MOD 45 MIN: CPT

## 2023-05-17 NOTE — HISTORY OF PRESENT ILLNESS
[de-identified] : 47 yr old female presents for evaluation for bariatric surgery. Patient has a long history of weight problems and has been working with Dr Contreras  for weight management.

## 2023-07-05 ENCOUNTER — APPOINTMENT (OUTPATIENT)
Dept: PSYCHIATRY | Facility: CLINIC | Age: 48
End: 2023-07-05
Payer: COMMERCIAL

## 2023-07-05 PROCEDURE — 90832 PSYTX W PT 30 MINUTES: CPT | Mod: 95

## 2023-07-05 NOTE — REASON FOR VISIT
[Patient preference] : as per patient preference [Telehealth (audio & video) - Individual/Group] : This visit was provided via telehealth using real-time 2-way audio visual technology. [Other Location: e.g. Home (Enter Location, City,State)___] : The patient, [unfilled], was located at [unfilled] at the time of the visit. [Verbal consent obtained from patient/other participant(s)] : Verbal consent for telehealth/telephonic services obtained from patient/other participant(s) [FreeTextEntry4] : 5pm [FreeTextEntry5] : 530pm

## 2023-07-05 NOTE — PLAN
[FreeTextEntry2] : met with pt to screen for self and other group [Other: ____] : [unfilled] [de-identified] : writer met with pt to screen for self and other process group. writer provided psychoeducation on what the group entails. pt was forthcoming in responding to all of writer's questions regarding her interest in therapy. she described relational difficulties with her mother, partner, and children in great detail and was able to provide insight into different patterns she notices in herself across these relationships. She was able to see how she might be able to use group to begin to address some of these difficulties. Writer provided details around group expectations and pt stated she was interested in joining. First group is scheduled for 7/26.  [Recommended Frequency of Visits: ____] : Recommended frequency of visits: [unfilled] [FreeTextEntry1] : begin self and other group 7/29. begin individual therapy when available.

## 2023-07-26 ENCOUNTER — APPOINTMENT (OUTPATIENT)
Dept: PSYCHIATRY | Facility: CLINIC | Age: 48
End: 2023-07-26

## 2023-07-26 ENCOUNTER — NON-APPOINTMENT (OUTPATIENT)
Age: 48
End: 2023-07-26

## 2023-07-26 NOTE — DISCUSSION/SUMMARY
[FreeTextEntry1] : Pt informed writer that due to a change in her work schedule, she would be unable to join self and other group (see email to writer below). Writer acknowledged pt's change in plan. \par \par Good morning Tarun,\par I hope all is well and you had a great time off. \par Unfortunately, I can no longer make these group classes due to change in work schedule. \par If anything changes, I’ll be sure to let you know. \par Thank you so much. \par \par Best,\par \par Aury

## 2023-08-02 ENCOUNTER — APPOINTMENT (OUTPATIENT)
Dept: PSYCHIATRY | Facility: CLINIC | Age: 48
End: 2023-08-02

## 2023-08-09 ENCOUNTER — APPOINTMENT (OUTPATIENT)
Dept: PSYCHIATRY | Facility: CLINIC | Age: 48
End: 2023-08-09

## 2023-08-16 ENCOUNTER — APPOINTMENT (OUTPATIENT)
Dept: PSYCHIATRY | Facility: CLINIC | Age: 48
End: 2023-08-16

## 2023-08-21 NOTE — HEALTH RISK ASSESSMENT
[Very Good] : ~his/her~  mood as very good [Never] : Never [Yes] : Yes [Monthly or less (1 pt)] : Monthly or less (1 point) [No falls in past year] : Patient reported no falls in the past year [0] : 2) Feeling down, depressed, or hopeless: Not at all (0) [PHQ-2 Negative - No further assessment needed] : PHQ-2 Negative - No further assessment needed [[id number]] : 0 [HIV Test offered] : HIV Test offered [Hepatitis C test offered] : Hepatitis C test offered [Change in mental status noted] : No change in mental status noted [None] : None [Employed] : employed [Sexually Active] : sexually active [High Risk Behavior] : no high risk behavior [Feels Safe at Home] : Feels safe at home [Fully functional (bathing, dressing, toileting, transferring, walking, feeding)] : Fully functional (bathing, dressing, toileting, transferring, walking, feeding) [Fully functional (using the telephone, shopping, preparing meals, housekeeping, doing laundry, using] : Fully functional and needs no help or supervision to perform IADLs (using the telephone, shopping, preparing meals, housekeeping, doing laundry, using transportation, managing medications and managing finances) Nsaids Counseling: NSAID Counseling: I discussed with the patient that NSAIDs should be taken with food. Prolonged use of NSAIDs can result in the development of stomach ulcers.  Patient advised to stop taking NSAIDs if abdominal pain occurs.  The patient verbalized understanding of the proper use and possible adverse effects of NSAIDs.  All of the patient's questions and concerns were addressed.

## 2023-08-23 ENCOUNTER — APPOINTMENT (OUTPATIENT)
Dept: PSYCHIATRY | Facility: CLINIC | Age: 48
End: 2023-08-23

## 2023-08-30 ENCOUNTER — APPOINTMENT (OUTPATIENT)
Dept: PSYCHIATRY | Facility: CLINIC | Age: 48
End: 2023-08-30

## 2023-09-06 ENCOUNTER — APPOINTMENT (OUTPATIENT)
Dept: PSYCHIATRY | Facility: CLINIC | Age: 48
End: 2023-09-06

## 2023-09-08 ENCOUNTER — NON-APPOINTMENT (OUTPATIENT)
Age: 48
End: 2023-09-08

## 2023-09-11 ENCOUNTER — NON-APPOINTMENT (OUTPATIENT)
Age: 48
End: 2023-09-11

## 2023-09-13 ENCOUNTER — APPOINTMENT (OUTPATIENT)
Dept: PSYCHIATRY | Facility: CLINIC | Age: 48
End: 2023-09-13

## 2023-09-18 ENCOUNTER — NON-APPOINTMENT (OUTPATIENT)
Age: 48
End: 2023-09-18

## 2023-09-20 ENCOUNTER — NON-APPOINTMENT (OUTPATIENT)
Age: 48
End: 2023-09-20

## 2023-09-20 ENCOUNTER — APPOINTMENT (OUTPATIENT)
Dept: PSYCHIATRY | Facility: CLINIC | Age: 48
End: 2023-09-20

## 2023-09-27 ENCOUNTER — NON-APPOINTMENT (OUTPATIENT)
Age: 48
End: 2023-09-27

## 2023-09-27 ENCOUNTER — APPOINTMENT (OUTPATIENT)
Dept: PSYCHIATRY | Facility: CLINIC | Age: 48
End: 2023-09-27

## 2023-10-01 ENCOUNTER — OUTPATIENT (OUTPATIENT)
Dept: OUTPATIENT SERVICES | Facility: HOSPITAL | Age: 48
LOS: 1 days | Discharge: HOME | End: 2023-10-01

## 2023-10-01 DIAGNOSIS — F43.22 ADJUSTMENT DISORDER WITH ANXIETY: ICD-10-CM

## 2023-10-04 ENCOUNTER — APPOINTMENT (OUTPATIENT)
Dept: PSYCHIATRY | Facility: CLINIC | Age: 48
End: 2023-10-04

## 2023-10-11 ENCOUNTER — APPOINTMENT (OUTPATIENT)
Dept: PSYCHIATRY | Facility: CLINIC | Age: 48
End: 2023-10-11

## 2023-10-17 ENCOUNTER — APPOINTMENT (OUTPATIENT)
Dept: PSYCHIATRY | Facility: CLINIC | Age: 48
End: 2023-10-17

## 2023-10-18 ENCOUNTER — APPOINTMENT (OUTPATIENT)
Dept: PSYCHIATRY | Facility: CLINIC | Age: 48
End: 2023-10-18

## 2023-10-25 ENCOUNTER — NON-APPOINTMENT (OUTPATIENT)
Age: 48
End: 2023-10-25

## 2023-10-25 ENCOUNTER — APPOINTMENT (OUTPATIENT)
Dept: PSYCHIATRY | Facility: CLINIC | Age: 48
End: 2023-10-25

## 2023-11-01 ENCOUNTER — APPOINTMENT (OUTPATIENT)
Dept: PSYCHIATRY | Facility: CLINIC | Age: 48
End: 2023-11-01

## 2023-11-08 ENCOUNTER — APPOINTMENT (OUTPATIENT)
Dept: PSYCHIATRY | Facility: CLINIC | Age: 48
End: 2023-11-08

## 2023-11-15 ENCOUNTER — APPOINTMENT (OUTPATIENT)
Dept: PSYCHIATRY | Facility: CLINIC | Age: 48
End: 2023-11-15

## 2023-11-16 ENCOUNTER — APPOINTMENT (OUTPATIENT)
Dept: PSYCHIATRY | Facility: CLINIC | Age: 48
End: 2023-11-16

## 2023-11-22 ENCOUNTER — APPOINTMENT (OUTPATIENT)
Dept: PSYCHIATRY | Facility: CLINIC | Age: 48
End: 2023-11-22

## 2023-11-28 ENCOUNTER — APPOINTMENT (OUTPATIENT)
Dept: PSYCHIATRY | Facility: CLINIC | Age: 48
End: 2023-11-28

## 2023-11-28 DIAGNOSIS — F43.22 ADJUSTMENT DISORDER WITH ANXIETY: ICD-10-CM

## 2023-11-29 PROBLEM — F43.22 ADJUSTMENT DISORDER WITH ANXIOUS MOOD: Status: ACTIVE | Noted: 2023-11-29

## 2023-12-01 ENCOUNTER — NON-APPOINTMENT (OUTPATIENT)
Age: 48
End: 2023-12-01

## 2023-12-05 ENCOUNTER — APPOINTMENT (OUTPATIENT)
Dept: PSYCHIATRY | Facility: CLINIC | Age: 48
End: 2023-12-05

## 2023-12-12 ENCOUNTER — APPOINTMENT (OUTPATIENT)
Dept: PSYCHIATRY | Facility: CLINIC | Age: 48
End: 2023-12-12

## 2023-12-12 DIAGNOSIS — F34.1 DYSTHYMIC DISORDER: ICD-10-CM

## 2023-12-15 NOTE — REASON FOR VISIT
[Patient preference] : as per patient preference [Telephone (audio) - Individual/Group] : This telephonic visit was provided via audio only technology. [Technical or other issues] : Patient unable to effectively utilize tele-video due to technical or other issues. [This encounter was initiated by telehealth (audio with video) and converted to telephone (audio only) due to technical difficulties.] : This encounter was initiated by telehealth (audio with video) and converted to telephone (audio only) due to technical difficulties. [Other Location: e.g. Home (Enter Location, City,State)___] : The patient, [unfilled], was located at [unfilled] at the time of the visit. [Verbal consent obtained from patient/other participant(s)] : Verbal consent for telehealth/telephonic services obtained from patient/other participant(s) [FreeTextEntry4] : 5:20pm [FreeTextEntry5] : 6:00pm [Patient] : Patient [FreeTextEntry1] : Pt presented for individual therapy to address anxiety.

## 2023-12-15 NOTE — PLAN
[FreeTextEntry2] : Goal 1 (in patient words): "To manage my feelings and angst from day-to-day." Goal 2: To explore shifts in identity and "try to find time for me and find myself." [Psychodynamic Therapy] : Psychodynamic Therapy  [Supportive Therapy] : Supportive Therapy [de-identified] : Aury arrived a few minutes late session. Discussed difficulty engaging activities that would allow her to focus on her needs. Reflected on this difficulty as an intergenerational pattern and spoke to role of culture. Writer and Aury explored ways she could care for herself over the next week. Writer provided a supportive environment via validation, empathy, exploration. Aury ended the session in good emotional and behavioral control.  [FreeTextEntry1] : 1. Weekly individual therapy

## 2023-12-15 NOTE — PHYSICAL EXAM
[Average] : average [Cooperative] : cooperative [Euthymic] : euthymic [Full] : full [Clear] : clear [Linear/Goal Directed] : linear/goal directed [WNL] : within normal limits [de-identified] : No SI endorsed.

## 2024-01-02 ENCOUNTER — NON-APPOINTMENT (OUTPATIENT)
Age: 49
End: 2024-01-02

## 2024-01-09 ENCOUNTER — APPOINTMENT (OUTPATIENT)
Dept: PSYCHIATRY | Facility: CLINIC | Age: 49
End: 2024-01-09

## 2024-01-12 ENCOUNTER — APPOINTMENT (OUTPATIENT)
Dept: PSYCHIATRY | Facility: CLINIC | Age: 49
End: 2024-01-12

## 2024-01-15 NOTE — PHYSICAL EXAM
[Average] : average [Cooperative] : cooperative [Euthymic] : euthymic [Full] : full [Clear] : clear [Linear/Goal Directed] : linear/goal directed [WNL] : within normal limits [de-identified] : Tearful at times [de-identified] : No SI endorsed.

## 2024-01-15 NOTE — PLAN
[FreeTextEntry2] : Goal 1 (in patient words): "To manage my feelings and angst from day-to-day." Goal 2: To explore shifts in identity and "try to find time for me and find myself." [Psychodynamic Therapy] : Psychodynamic Therapy  [Supportive Therapy] : Supportive Therapy [de-identified] : Aury arrived on time to session. Reflected on improvements towards attending to her needs. Explored impact of her parent's divorce on her development. Discussed family history and current dynamics. Writer provided a supportive environment via validation, empathy, exploration, and. reflection. Aury ended the session in good emotional and behavioral control.  [FreeTextEntry1] : 1. Weekly individual therapy

## 2024-01-15 NOTE — REASON FOR VISIT
[Patient preference] : as per patient preference [Telehealth (audio & video) - Individual/Group] : This visit was provided via telehealth using real-time 2-way audio visual technology. [Technical or other issues] : Patient unable to effectively utilize tele-video due to technical or other issues. [This encounter was initiated by telehealth (audio with video) and converted to telephone (audio only) due to technical difficulties.] : This encounter was initiated by telehealth (audio with video) and converted to telephone (audio only) due to technical difficulties. [Medical Office: ([facility])___] : The provider was located at the medical office in [unfilled]. [Other Location: e.g. Home (Enter Location, City,State)___] : The patient, [unfilled], was located at [unfilled] at the time of the visit. [Verbal consent obtained from patient/other participant(s)] : Verbal consent for telehealth/telephonic services obtained from patient/other participant(s) [FreeTextEntry4] : 12:00pm [FreeTextEntry5] : 12:45pm [Patient] : Patient [FreeTextEntry1] : Pt presented for individual therapy to address anxiety.

## 2024-01-16 ENCOUNTER — APPOINTMENT (OUTPATIENT)
Dept: PSYCHIATRY | Facility: CLINIC | Age: 49
End: 2024-01-16

## 2024-01-16 DIAGNOSIS — F43.22 ADJUSTMENT DISORDER WITH ANXIETY: ICD-10-CM

## 2024-01-19 PROBLEM — F43.22 ADJUSTMENT DISORDER WITH ANXIOUS MOOD: Status: ACTIVE | Noted: 2023-03-21

## 2024-01-19 NOTE — PHYSICAL EXAM
[Average] : average [Cooperative] : cooperative [Euthymic] : euthymic [Full] : full [Clear] : clear [Linear/Goal Directed] : linear/goal directed [WNL] : within normal limits [de-identified] : No SI endorsed.

## 2024-01-19 NOTE — REASON FOR VISIT
[Patient preference] : as per patient preference [Telehealth (audio & video) - Individual/Group] : This visit was provided via telehealth using real-time 2-way audio visual technology. [Technical or other issues] : Patient unable to effectively utilize tele-video due to technical or other issues. [This encounter was initiated by telehealth (audio with video) and converted to telephone (audio only) due to technical difficulties.] : This encounter was initiated by telehealth (audio with video) and converted to telephone (audio only) due to technical difficulties. [Medical Office: ([facility])___] : The provider was located at the medical office in [unfilled]. [Other Location: e.g. Home (Enter Location, City,State)___] : The patient, [unfilled], was located at [unfilled] at the time of the visit. [Verbal consent obtained from patient/other participant(s)] : Verbal consent for telehealth/telephonic services obtained from patient/other participant(s) [FreeTextEntry4] : 5:20pm [FreeTextEntry5] : 6:00pm [Patient] : Patient [FreeTextEntry1] : Pt presented for individual therapy to address anxiety.

## 2024-01-19 NOTE — PLAN
[FreeTextEntry2] : Goal 1 (in patient words): "To manage my feelings and angst from day-to-day." Goal 2: To explore shifts in identity and "try to find time for me and find myself." [Psychodynamic Therapy] : Psychodynamic Therapy  [Supportive Therapy] : Supportive Therapy [de-identified] : Aury arrived on time to session. Reflected on progress towards attending engagement in self-care activities. Reflected on her discussion of the impact of her parent's divorce on her development and her time in the [place] . Writer provided a supportive environment via validation, empathy, exploration, and. reflection. Aury ended the session in good emotional and behavioral control.  [FreeTextEntry1] : 1. Weekly individual therapy

## 2024-01-22 ENCOUNTER — NON-APPOINTMENT (OUTPATIENT)
Age: 49
End: 2024-01-22

## 2024-01-30 ENCOUNTER — APPOINTMENT (OUTPATIENT)
Dept: PSYCHIATRY | Facility: CLINIC | Age: 49
End: 2024-01-30

## 2024-02-06 ENCOUNTER — APPOINTMENT (OUTPATIENT)
Dept: PSYCHIATRY | Facility: CLINIC | Age: 49
End: 2024-02-06

## 2024-02-26 ENCOUNTER — NON-APPOINTMENT (OUTPATIENT)
Age: 49
End: 2024-02-26

## 2024-03-03 NOTE — HISTORY OF PRESENT ILLNESS
[FreeTextEntry1] : 46 yo F, previously employed as executive assistance at [employer], domiciled with partner and children, presents seeking therapy at [facility]. Pt describes no significant mental health issues until the past year, when she began feeling overwhelmed at work jefe when completing multiple tasks, then feeling down and tearful. She describes feeling like this most days, with triggers involving work/home balance, managing responsibilities at home. No panic sx. Pt describes feeling overwhelmed in particular situations, has no significant worry about general or shifting topics. Pt is able to experience contentment. She has had diminished ability to experience her hobbies because she is taking care of her 4yo son and 15yo daughter who just started high school and is active in sports. Pt seeks therapy to help with skills to better manage anxiety.  No suicidal thoughts, desperation, or muscle tension. She reports some irritability and low energy. Mood is often down. Falling asleep is WNL, though she awakens approx 2am with worry. No early AM awakening; pt able to attend to responsbilities and function well. No psychotic sx. No prior depressive, anxious, or other notable periods from baseline in her life. [FreeTextEntry2] : no hx SI/SA,NSSI, admission, or violence.

## 2024-03-03 NOTE — TREATMENT
[de-identified] : 1x/weekly Individual Psychotherapy [FreeTextEntry8] : N/A [de-identified] : Supportive psychotherapy [de-identified] : N/A [de-identified] : Aury Tinajero is a 48-year-old Black, Heterosexual, cisgender,  female. Writer and met for four sessions from November-January. Sessions focused on pts difficulties managing anxiety symptoms, navigating her busy schedule while making time for her needs, processing the impact of her parent's divorce on her development as well as her experience in the [place] , and process family dynamics. After rescheduling multiple appointments, Aury emailed writer requesting to terminate treatment due to her busy schedule. Writer indicated that pt can outreach. writer or the clinic should her schedule change in the future. Pt declined referral options as she did not currently have time to engage in psychotherapy. [de-identified] : Pt made steps towards understanding the impact of her parent's divorce on her development. She also began exploring and identifying her needs.

## 2024-03-03 NOTE — PAST MEDICAL HISTORY
[FreeTextEntry1] : denies active problems or medications. Chart indicates iron deficiency anemia likely 2/2 fibroids per GI note this month, getting EGD and celiac w/u. NKDA.

## 2024-03-03 NOTE — REASON FOR VISIT
[Completed treatment - additional care not necessary at this time] : Completed treatment - additional care not necessary at this time [FreeTextEntry1] : [id number] [FreeTextEntry3] : [email or web address] [FreeTextEntry5] : English [FreeTextEntry6] : Aury [FreeTextEntry7] : She/Her/Hers [FreeTextEntry9] : 03/21/2023 [FreeTextEntry2] : Pt initally sought treatment for help with skills to better manage anxiety. [FreeTextEntry8] : 2/26/2023

## 2024-03-03 NOTE — PLAN
[de-identified] : No referral options provided. Pt was advised to contact [facility] in the future should her plans change. No further contact will be made with pt.  [Not applicable] : Not applicable